# Patient Record
Sex: FEMALE | Race: WHITE
[De-identification: names, ages, dates, MRNs, and addresses within clinical notes are randomized per-mention and may not be internally consistent; named-entity substitution may affect disease eponyms.]

---

## 2018-12-25 ENCOUNTER — HOSPITAL ENCOUNTER (EMERGENCY)
Dept: HOSPITAL 25 - UCCORT | Age: 63
Discharge: HOME | End: 2018-12-25
Payer: MEDICARE

## 2018-12-25 VITALS — SYSTOLIC BLOOD PRESSURE: 142 MMHG | DIASTOLIC BLOOD PRESSURE: 76 MMHG

## 2018-12-25 DIAGNOSIS — Z85.42: ICD-10-CM

## 2018-12-25 DIAGNOSIS — R91.8: ICD-10-CM

## 2018-12-25 DIAGNOSIS — L29.9: ICD-10-CM

## 2018-12-25 DIAGNOSIS — F41.9: ICD-10-CM

## 2018-12-25 DIAGNOSIS — M81.0: ICD-10-CM

## 2018-12-25 DIAGNOSIS — R04.2: Primary | ICD-10-CM

## 2018-12-25 PROCEDURE — 99211 OFF/OP EST MAY X REQ PHY/QHP: CPT

## 2018-12-25 PROCEDURE — G0463 HOSPITAL OUTPT CLINIC VISIT: HCPCS

## 2018-12-25 PROCEDURE — 71046 X-RAY EXAM CHEST 2 VIEWS: CPT

## 2018-12-25 NOTE — UC
General HPI





- HPI Summary


HPI Summary: 





pt c/o a cough for the past 1.5-2 weeks. began with a sore throat prior to that 

which has resolved.


over the past 4 days on average of twice daily, pt will raise blood with her 

sputum.


denies fever, sob, wheezing and chest pain.


no hx TB.


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 can not recall every 

having TB testing.








- History of Current Complaint


Chief Complaint: UCRespiratory


Stated Complaint: COUGH W/BLOOD


Time Seen by Provider: 12/25/18 10:52


Hx Obtained From: Patient


Onset/Duration: Gradual Onset


Pain Intensity: 0


Associated Signs & Symptoms: Positive: Cough, Hemoptysis, Other - fatigue.  

Negative: Chest Pain, Fever, SOB, Wheezing





- Allergy/Home Medications


Allergies/Adverse Reactions: 


 Allergies











Allergy/AdvReac Type Severity Reaction Status Date / Time


 


No Known Allergies Allergy   Verified 12/25/18 10:52











Home Medications: 


 Home Medications





Alendronate Sodium [Fosamax-] 1 tab WEEKLY 12/25/18 [History Confirmed 12/25/18]


Cholecalciferol TAB* [Vitamin D TAB*] 1 tab DAILY 12/25/18 [History Confirmed 12 /25/18]


Escitalopram Oxalate [Lexapro 20 mg] 1 tab QAM 12/25/18 [History Confirmed 12/25 /18]


hydrOXYzine HCL TAB* [Atarax 25 MG TAB*] 1 tab BEDTIME 12/25/18 [History 

Confirmed 12/25/18]











PMH/Surg Hx/FS Hx/Imm Hx





- Additional Past Medical History


Additional PMH: 





uterine CA, itchy skin, anxiety, osteoporosis





- Surgical History


Surgical History: Yes


Surgery Procedure, Year, and Place: Hysterectomy





- Social History


Lives: With Family


Alcohol Use: None


Substance Use Type: None


Smoking Status (MU): Never Smoked Tobacco





- Immunization History


Vaccination Up to Date: Yes





Review of Systems


All Other Systems Reviewed And Are Negative: Yes


Constitutional: Positive: Fatigue


Skin: Positive: Negative


Eyes: Positive: Negative


ENT: Positive: Negative


Respiratory: Positive: Cough.  Negative: Shortness Of Breath


Cardiovascular: Negative: Chest Pain


Gastrointestinal: Positive: Negative


Genitourinary: Positive: Negative


Motor: Positive: Negative


Neurovascular: Positive: Negative


Musculoskeletal: Positive: Negative


Neurological: Positive: Negative


Psychological: Positive: Negative


Is Patient Immunocompromised?: No





Physical Exam


Triage Information Reviewed: Yes


Appearance: Well-Appearing, Other: - Tissue at bedside has clear sputum with 

mix of pink to light red color. No clots. No cough or hemoptysis at time of 

exam.


Vital Signs: 


 Initial Vital Signs











Temp  97.5 F   12/25/18 10:55


 


Pulse  78   12/25/18 10:55


 


Resp  16   12/25/18 10:55


 


BP  142/76   12/25/18 10:55


 


Pulse Ox  99   12/25/18 10:55











Vital Signs Reviewed: Yes


Eyes: Positive: Conjunctiva Clear


ENT: Positive: Pharynx normal, TMs normal.  Negative: Nasal congestion, Nasal 

drainage


Neck: Positive: Supple, Nontender, No Lymphadenopathy


Respiratory: Positive: Lungs clear, Normal breath sounds, No respiratory 

distress


Cardiovascular: Positive: RRR, No Murmur


Abdomen Description: Positive: Nontender, No Organomegaly, Soft


Bowel Sounds: Positive: Present


Musculoskeletal: Positive: ROM Intact


Neurological: Positive: Alert


Psychological: Positive: Normal Response To Family, Age Appropriate Behavior


Skin Exam: Normal





Diagnostics





- Radiology


  ** No standard instances


Radiology Interpretation Completed By: Radiologist - IMPRESSION: Lung masses in 

the right upper lobe, right lower lobe and left upper lobe for which lung 

masses and possible metastatic disease should BE considered.





Course/Dx





- Course


Course Of Treatment: RESULTS OF CXR RESULT D/W PT AND HER . NEED FOR F/U 

TOMORROW WAS STRESSED ALSO GO TO ER FOR ANY WORSENING.





- Differential Dx - Multi-Symptom


Differential Diagnoses: Other - bronchitis, pneumonia, TB,: however, given 

masses on cxr, CA is very possible





- Diagnoses


Provider Diagnosis: 


 Cough with hemoptysis, Lung mass








Discharge





- Sign-Out/Discharge


Documenting (check all that apply): Patient Departure


All imaging exams completed and their final reports reviewed: Yes





- Discharge Plan


Condition: Stable


Disposition: HOME


Patient Education Materials:  Hemoptysis (ED)


Referrals: 


Ruma Crenshaw MD [Primary Care Provider] - 1 Day


Additional Instructions: 


DIAGNOSIS: HEMOPTYSIS, LUNG MASSES ON CXR.





FOLLOW UP WITH YOUR PRIMARY CARE IN AM.





GO TO ER FOR ANY WORSENING.





TAKE THE DISC AND XRAY REPORT TO YOUR PRIMARY CARE FOLLOW UP VISIT.





- Billing Disposition and Condition


Condition: STABLE


Disposition: Home

## 2020-09-17 ENCOUNTER — OFFICE VISIT (OUTPATIENT)
Dept: URBAN - METROPOLITAN AREA CLINIC 82 | Facility: CLINIC | Age: 65
End: 2020-09-17